# Patient Record
Sex: MALE | Race: BLACK OR AFRICAN AMERICAN | Employment: OTHER | RURAL
[De-identification: names, ages, dates, MRNs, and addresses within clinical notes are randomized per-mention and may not be internally consistent; named-entity substitution may affect disease eponyms.]

---

## 2024-08-29 ENCOUNTER — HOSPITAL ENCOUNTER (OUTPATIENT)
Facility: HOSPITAL | Age: 89
Setting detail: RECURRING SERIES
End: 2024-08-29
Payer: OTHER GOVERNMENT

## 2024-08-29 PROCEDURE — 97165 OT EVAL LOW COMPLEX 30 MIN: CPT

## 2024-08-29 PROCEDURE — 97110 THERAPEUTIC EXERCISES: CPT

## 2024-08-29 NOTE — PROGRESS NOTES
[facility]  [address] [place] [address]  Office [phone]  Fax [phone]          OCCUPATIONAL THERAPY - MEDICARE EVALUATION/PLAN OF CARE NOTE (updated 3/23)      Date: 2024          Patient Name:  Vinnie Rodriguez :  3/10/1932   Medical   Diagnosis:  Pain in right shoulder [M25.511] Treatment Diagnosis:  M25.511  RIGHT SHOULDER PAIN  and M62.81  GENERAL MUSCLE WEAKNESS    Referral Source:  Nikko Mtz MD Insurance:  Payor: [employer] / Plan: [employer] / Product Type: *No Product type* /       Patient  verified yes     Visit #   Current  / Total 1 15       SUBJECTIVE  Pain Level (0-10 scale): 0/10 today  []constant []intermittent []improving []worsening []no change since onset    Any medication changes, allergies to medications, adverse drug reactions, diagnosis change, or new procedure performed?: [x] No    [] Yes (see summary sheet for update)  Medications: Verified on Patient Summary List    Subjective functional status/changes:     Pt reports he has 26+ yr old injury to the L shoulder, impaired R shoulder pre-existing.  He has to use L hand to self feed as R is so limited.  Was reaching with L shoulder and had sharp pain for past few months with loss of movement.      Onset Date: flared up old injury approx 2 months ago  Start of Care: 2024  PLOF: Used LUE to eat, primary for all activities including cutting grass, self care like washing his back  Limitations to PLOF: difficulty using knife/fork and eating, cannot wash his back well, problems with strength and movement  Mechanism of Injury: overstretched arm per pt  Current symptoms/Complaints: mild pain, weakness, loss of movement  Previous Treatment/Compliance: none  PMHx/Surgical Hx: hearing impaired and uses hearing aides, arthritis  Prior Hospitalization:  did not reveal  Comorbidities: as above  Work Hx: retired  Living Situation: lives with spouse  Pt Goals:  Shown pendulum exercises for flex/ext and circles w/handout if he cannot obtain pulleys; ed on ice for soreness and heat for stiffness   30 30    Total Total     [x]  Patient Education billed concurrently with other procedures   [x] Review HEP  theraband as above, pendulum or pulley, putty L hand  [] Progressed/Changed HEP, detail:    [] Other detail:              Time   In / Out 1405 1445   Total Treatment Time 40   Total Timed Codes 40   1:1 Treatment Time 40      [facility] Totals Reminder:  bill using total billable   min of TIMED therapeutic procedures and modalities.   8-22 min = 1 unit; 23-37 min = 2 units; 38-52 min = 3 units;  53-67 min = 4 units; 68-82 min = 5 units     Skin assessment post-treatment (if applicable):    [x]  intact    []  redness- no adverse reaction                 []redness - adverse reaction:          Pain Level at end of session (0-10 scale): 0/10    Plan of Care / Statement of Necessity for Occupational Therapy Services     Assessment / key information:  Pt reports he fell on ice approx 1998 or before and injured L shoulder.  He also has limits in R shoulder and is not able to raise R shoulder to get food to mouth.  A couple of months ago he was reaching very far and then started to have pain again in the L shoulder.  He has loss of AROM.  His form indicates he had an xray but he did not know results.  Stated MD indicated he has RTC tear but uncertain extent.    RHD but eats w/L hand as cannot get food to mouth R hand  AROM: L shoulder flexion active is 50; passive using pulley is 110   L shoulder abduction active 45   L ER only to neutral   R 41lbs; L 16lbs    Evaluation Complexity: History LOW Complexity : Brief history review  Examination LOW Complexity : 1-3 performance deficits relating to physical, cognative, or psychosocial skils that result in activity limitations and / or participation restrictions  Clinical Decision Making LOW Complexity : No comorbidities that affect

## 2024-09-03 ENCOUNTER — HOSPITAL ENCOUNTER (OUTPATIENT)
Facility: HOSPITAL | Age: 89
Setting detail: RECURRING SERIES
Discharge: HOME | End: 2024-09-06
Payer: OTHER GOVERNMENT

## 2024-09-03 PROCEDURE — 97110 THERAPEUTIC EXERCISES: CPT

## 2024-09-03 PROCEDURE — 97112 NEUROMUSCULAR REEDUCATION: CPT

## 2024-09-03 PROCEDURE — 97140 MANUAL THERAPY 1/> REGIONS: CPT

## 2024-09-03 NOTE — PROGRESS NOTES
pre-treatment girth :    post-treatment girth :    measured at (landmark       location) :   If using vaso (only need to measure limb vaso being performed on)        min []  Other:        Skin assessment post-treatment (if applicable):    [x]  intact    []  redness- no adverse reaction                 []redness - adverse reaction:          [x]  Patient Education billed concurrently with other procedures   [x] Review HEP    [] Progressed/Changed HEP, detail:    [] Other detail:           Pain Level at end of session (0-10 scale): n/c      Assessment   Pt indicates L UE condition is what he wants worked on.  OTR worked on RUE as well including joint mob and stretch. He was not able to fully perform cane exercises as RUE is old injury more severe than LUE.  He will ad pendulum w/weight via can to HEP for strengthening.  Family intends to purchase shoulder pulleys for home.  Cannot make next session. Returns next week.  Patient will continue to benefit from skilled PT / OT services to modify and progress therapeutic interventions, analyze and address ROM deficits, analyze and address strength deficits, analyze and address soft tissue restrictions, analyze and cue for proper movement patterns, and analyze and modify for postural abnormalities to address functional deficits and attain remaining goals.    Progress toward goals / Updated goals:  []  See Progress Note/Recertification    Short Term Goals: To be accomplished in 8 treatments  Pt reports compliance w/HEP 5 of 7 days per week.  Pt is able to increase L hand  strength to 25lbs.  Pt is able to abduct L shoulder to 80 degrees  with pain no greater than 2 needed for bathing.  Pt is able to flex L shoudler to 90 degrees w/pain no greater than 1.     Long Term Goals: To be accomplished in 15 treatments  Pt demonstrates 100 degrees of painfree active L shoulder flexion.  Pt is able to tolerate MMT in LUE.  Pt demonstrates 90 degrees of painfree L shoulder abduction

## 2024-09-09 ENCOUNTER — HOSPITAL ENCOUNTER (OUTPATIENT)
Facility: HOSPITAL | Age: 89
Setting detail: RECURRING SERIES
Discharge: HOME | End: 2024-09-12
Payer: OTHER GOVERNMENT

## 2024-09-09 PROCEDURE — 97112 NEUROMUSCULAR REEDUCATION: CPT

## 2024-09-09 PROCEDURE — 97110 THERAPEUTIC EXERCISES: CPT

## 2024-09-12 ENCOUNTER — HOSPITAL ENCOUNTER (OUTPATIENT)
Facility: HOSPITAL | Age: 89
Setting detail: RECURRING SERIES
End: 2024-09-12
Payer: OTHER GOVERNMENT

## 2024-09-12 NOTE — PROGRESS NOTES
[facility]  [address] [place] [address]  Office [phone]  Fax [phone]   OCCUPATIONAL THERAPY PROGRESS NOTE  Patient Name:  Vinnie Rodriguez :  3/10/1932   Treatment/Medical Diagnosis:  Pain in left shoulder [M25.512]   Referral Source:  Ros Delarosa MD     Date of Initial Visit:  24 Attended Visits:  3 Missed Visits:  1     SUMMARY OF TREATMENT/ASSESSMENT:  Pt has been seen for 3 visits and received thera-ex, manual therapy and one short course of NMES to help stimulate LUE contraction.    Pt presented in clinic to cancel appointment stating he wants to get an MRI on the L shoulder before doing more exercises due to increased pain after last session.    CURRENT STATUS  No change    Short Term Goals: To be accomplished in 8 treatments  Pt reports compliance w/HEP 5 of 7 days per week.  Pt is able to increase L hand  strength to 25lbs.  Pt is able to abduct L shoulder to 80 degrees  with pain no greater than 2 needed for bathing.  Pt is able to flex L shoudler to 90 degrees w/pain no greater than 1.     Long Term Goals: To be accomplished in 15 treatments  Pt demonstrates 100 degrees of painfree active L shoulder flexion.  Pt is able to tolerate MMT in LUE.  Pt demonstrates 90 degrees of painfree L shoulder abduction and is able to reach behind head.  Pt is able to reach behind back to help pull up his pants.      RECOMMENDATIONS  Pt on hold for therapy until he contacts clinic to alert that MRI completed and he is willing to continue therapy.  Current script valid until 24        Apple Bernard OT       2024       1:22 PM    If you have any questions/comments please contact us directly at [phone].   Thank you for allowing us to assist in the care of your patient.

## 2024-09-17 ENCOUNTER — APPOINTMENT (OUTPATIENT)
Facility: HOSPITAL | Age: 89
End: 2024-09-17
Payer: OTHER GOVERNMENT